# Patient Record
Sex: FEMALE | Race: ASIAN | Employment: UNEMPLOYED | URBAN - METROPOLITAN AREA
[De-identification: names, ages, dates, MRNs, and addresses within clinical notes are randomized per-mention and may not be internally consistent; named-entity substitution may affect disease eponyms.]

---

## 2024-11-20 ENCOUNTER — OFFICE VISIT (OUTPATIENT)
Dept: ORTHOPEDICS CLINIC | Facility: CLINIC | Age: 69
End: 2024-11-20

## 2024-11-20 ENCOUNTER — TELEPHONE (OUTPATIENT)
Dept: ORTHOPEDICS CLINIC | Facility: CLINIC | Age: 69
End: 2024-11-20

## 2024-11-20 VITALS — BODY MASS INDEX: 24.8 KG/M2 | HEIGHT: 63 IN | WEIGHT: 140 LBS

## 2024-11-20 DIAGNOSIS — M17.0 PRIMARY OSTEOARTHRITIS OF BOTH KNEES: Primary | ICD-10-CM

## 2024-11-20 PROCEDURE — 99204 OFFICE O/P NEW MOD 45 MIN: CPT | Performed by: ORTHOPAEDIC SURGERY

## 2024-11-20 NOTE — PROGRESS NOTES
NURSING INTAKE COMMENTS:   Chief Complaint   Patient presents with    Knee Pain     Bilateral knee pain, ongoing for 5 years, previously treated with with cortisone injection (last inj 1 year ago), referred by pcp Dr. Leonardo Hackett patient- did not bring list of medications and does not know names of meds she is taking       HPI: This 69 year old female presents today with complaints of bilateral knee pain, greater on the right than the left.  This is been present and increasing for a number of years.  This is a great deal of difficulty walking even short distances.  If she sits she has to wait after she has stands up before she can move.    Past Medical History:    Essential hypertension     History reviewed. No pertinent surgical history.  No current outpatient medications on file.     Allergies[1]  History reviewed. No pertinent family history.    Social History     Occupational History    Not on file   Tobacco Use    Smoking status: Never     Passive exposure: Never    Smokeless tobacco: Never   Vaping Use    Vaping status: Never Used   Substance and Sexual Activity    Alcohol use: Never    Drug use: Never    Sexual activity: Not on file        Review of Systems:  GENERAL: feels generally well, no significant weight loss or weight gain  SKIN: no ulcerated or worrisome skin lesions  EYES:denies blurred vision or double vision  HEENT: denies new nasal congestion, sinus pain or ST  LUNGS: denies shortness of breath  CARDIOVASCULAR: denies chest pain  GI: no hematemesis, no worsening heartburn, no diarrhea  : no dysuria, no blood in urine, no difficulty urinating, no incontinence  MUSCULOSKELETAL: no other musculoskeletal complaints other than in HPI  NEURO: no numbness or tingling, no weakness or balance disorder  PSYCHE: no depression or anxiety  HEMATOLOGIC: no hx of blood dyscrasia  ENDOCRINE: no thyroid or diabetes issues  ALL/ASTHMA: no new hx of severe allergy or asthma    Physical Examination:    Ht 5' 3\"  (1.6 m)   Wt 140 lb (63.5 kg)   BMI 24.80 kg/m²   Constitutional: appears well hydrated, alert and responsive, no acute distress noted  Extremities: She is obviously a great deal of pain and had difficulty moving from the chair to the exam table.  10 degree varus deformity of both knees.  5 degree flexion contractures with further flexion to 100 degrees and 10 degrees bilaterally.  No instability.  Neurological: Active plantarflexion dorsiflexion of feet.  Pulses: 2+ posterior tibial.    Imaging: Outside films from October of this year with the last name Dieter, which is the patient's current legal name, demonstrate end-stage osteoarthritis bilaterally with complete loss of medial joint space, compensatory lateral joint space widening, patellofemoral arthritis, and varus deformity bilaterally.    No results found for: \"WBC\", \"HGB\", \"PLT\"   No results found for: \"GLU\", \"BUN\", \"CREATSERUM\", \"GFR\", \"GFRNAA\", \"GFRAA\"     Assessment and Plan:  Diagnoses and all orders for this visit:    Primary osteoarthritis of both knees  -     MRI KNEE, RIGHT ([id number]); Future        Assessment She has advanced osteoarthritis of the right knee that has not responded to conservative management.  I discussed right total knee replacement.  We discussed the risks and indications for surgery as well as the common possible complications.  Our discussion included, but was not limited to the potential risks of anesthetic complication, infection, bleeding, DVT or PE, nerve or blood vessel injury, stiffness, the potential need for revision surgery, leg length inequality, as well as the potential risk of unanticipated perioperative medical or orthopedic complications. She would like to proceed.  Surgery has been scheduled pending medical clearance.   Plan: She will obtain medical and dental clearance and follows up with us a week prior to surgery to answer any open questions and review her medical and dental clearances.      The above note  was creating using Dragon speech recognition technology. Please excuse any typos.    LUZ HOUSTON MD       [1] No Known Allergies

## 2024-11-20 NOTE — TELEPHONE ENCOUNTER
Patient was given pre surgical papers today in clinic. Including Dental clearance form, [facility] Surgery Check List (Medical clearance), Pre Operative Education for Total Joint replacements and [employer]act[employer]/Oscar booklet.